# Patient Record
Sex: FEMALE | Race: WHITE
[De-identification: names, ages, dates, MRNs, and addresses within clinical notes are randomized per-mention and may not be internally consistent; named-entity substitution may affect disease eponyms.]

---
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
## 2021-11-08 NOTE — EDPHY
H & P


Stated Complaint: RLQ abd pain


Time Seen by Provider: 03/19/18 12:50


HPI/ROS: 





Chief Complaint:  Abdominal pain, fever, back pain





HPI:  18-year-old woman began having fevers last night with some low back pain.

  She did have an episode of some right side abdominal pain which has since 

resolved.  She took Advil last night and slept overnight.  This morning she is 

continuing to have low back pain and some subjective fever but has not had any 

further abdominal pain.  No urinary urgency or frequency.  Last menstrual 

period was about a month ago and normal.  She is on oral contraceptives.  Has 

never been pregnant.  No abdominal vaginal discharge.  No history of STDs in 

the past.  No nausea or vomiting.  Some constipation.





ROS:  10 point Review of Systems is negative except as noted in the HPI.





PMH:  Denies





Social History: No smoking, no alcohol,  no recreational drug use





Family History: non-contributory





Physical Exam:


Gen: Awake, Alert, No Distress


HEENT:  


     Nose: no rhinorrhea


     Eyes: PERRLA, EOMI


     Mouth: Moist mucosa 


Neck: Supple, no JVD


Chest: nontender, lungs clear to auscultation


Heart: S1, S2 normal, no murmur


Abd: Soft, non-tender, no guarding


Back: no CVA tenderness, no midline tenderness 


Ext: no edema, non-tender


Skin: no rash


Neuro: CN II-XII intact, Sensation grossly intact, Strength 5/5 in bilateral 

upper and lower extremities








- Personal History


LMP (Females 10-55): 22-28 Days Ago


Current Tetanus/Diphtheria Vaccine: Yes


Current Tetanus Diphtheria and Acellular Pertussis (TDAP): Yes





- Medical/Surgical History


Hx Asthma: No


Hx Chronic Respiratory Disease: No


Hx Diabetes: No


Hx Cardiac Disease: No


Hx Renal Disease: No


Hx Cirrhosis: No


Hx Alcoholism: No


Hx HIV/AIDS: No


Hx Splenectomy or Spleen Trauma: No


Other PMH: L knee surgery x2,





- Social History


Smoking Status: Never smoked


Constitutional: 





 Initial Vital Signs











Temperature (C)  37.2 C   03/19/18 11:44


 


Heart Rate  101 H  03/19/18 11:44


 


Respiratory Rate  16   03/19/18 11:44


 


Blood Pressure  116/94 H  03/19/18 11:44


 


O2 Sat (%)  98   03/19/18 11:44








 











O2 Delivery Mode               Room Air














Allergies/Adverse Reactions: 


 





No Known Allergies Allergy (Unverified 03/19/18 11:44)


 








Home Medications: 














 Medication  Instructions  Recorded


 


Bcp  03/19/18














Medical Decision Making


ED Course/Re-evaluation: 





Well-appearing 18-year-old female with back pain, subjective fever and low 

abdominal pain yesterday.  She is no abdominal pain today.  On examination her 

abdomen is soft and benign.  Blood work and urinalysis are normal.  She is not 

pregnant.  Symptoms consistent with a likely early viral syndrome.  Will 

discharge with follow up at [facility], return for any concerns.





- Data Points


Laboratory Results: 





 Laboratory Results





 03/19/18 12:05 





 03/19/18 12:05 





 











  03/19/18 03/19/18 03/19/18





  12:05 12:05 12:05


 


WBC      5.36 10^3/uL 10^3/uL





     (3.80-9.50) 


 


RBC      4.82 10^6/uL 10^6/uL





     (4.18-5.33) 


 


Hgb      14.7 g/dL g/dL





     (12.6-16.3) 


 


Hct      41.7 % %





     (38.0-47.0) 


 


MCV      86.5 fL fL





     (81.5-99.8) 


 


MCH      30.5 pg pg





     (27.9-34.1) 


 


MCHC      35.3 g/dL g/dL





     (32.4-36.7) 


 


RDW      12.0 % %





     (11.5-15.2) 


 


Plt Count      169 10^3/uL 10^3/uL





     (150-400) 


 


MPV      9.9 fL fL





     (8.7-11.7) 


 


Neut % (Auto)      70.7 % %





     (39.3-74.2) 


 


Lymph % (Auto)      20.1 % %





     (15.0-45.0) 


 


Mono % (Auto)      8.2 % %





     (4.5-13.0) 


 


Eos % (Auto)      0.2 % L %





     (0.6-7.6) 


 


Baso % (Auto)      0.4 % %





     (0.3-1.7) 


 


Nucleat RBC Rel Count      0.0 % %





     (0.0-0.2) 


 


Absolute Neuts (auto)      3.79 10^3/uL 10^3/uL





     (1.70-6.50) 


 


Absolute Lymphs (auto)      1.08 10^3/uL 10^3/uL





     (1.00-3.00) 


 


Absolute Monos (auto)      0.44 10^3/uL 10^3/uL





     (0.30-0.80) 


 


Absolute Eos (auto)      0.01 10^3/uL L 10^3/uL





     (0.03-0.40) 


 


Absolute Basos (auto)      0.02 10^3/uL 10^3/uL





     (0.02-0.10) 


 


Absolute Nucleated RBC      0.00 10^3/uL 10^3/uL





     (0-0.01) 


 


Immature Gran %      0.4 % %





     (0.0-1.1) 


 


Immature Gran #      0.02 10^3/uL 10^3/uL





     (0.00-0.10) 


 


Sodium    137 mEq/L mEq/L  





    (135-145)  


 


Potassium    3.7 mEq/L mEq/L  





    (3.5-5.2)  


 


Chloride    102 mEq/L mEq/L  





    ()  


 


Carbon Dioxide    25 mEq/l mEq/l  





    (22-31)  


 


Anion Gap    10 mEq/L mEq/L  





    (8-16)  


 


BUN    11 mg/dL mg/dL  





    (7-23)  


 


Creatinine    0.7 mg/dL mg/dL  





    (0.6-1.0)  


 


Estimated GFR    > 60   





    


 


Glucose    122 mg/dL H mg/dL  





    ()  


 


Calcium    9.0 mg/dL mg/dL  





    (8.5-10.4)  


 


Beta HCG, Qual  NEGATIVE     





    


 


Urine Color      





    


 


Urine Appearance      





    


 


Urine pH      





    


 


Ur Specific Gravity      





    


 


Urine Protein      





    


 


Urine Ketones      





    


 


Urine Blood      





    


 


Urine Nitrate      





    


 


Urine Bilirubin      





    


 


Urine Urobilinogen      





    


 


Ur Leukocyte Esterase      





    


 


Urine Glucose      





    














  03/19/18





  12:00


 


WBC  





  


 


RBC  





  


 


Hgb  





  


 


Hct  





  


 


MCV  





  


 


MCH  





  


 


MCHC  





  


 


RDW  





  


 


Plt Count  





  


 


MPV  





  


 


Neut % (Auto)  





  


 


Lymph % (Auto)  





  


 


Mono % (Auto)  





  


 


Eos % (Auto)  





  


 


Baso % (Auto)  





  


 


Nucleat RBC Rel Count  





  


 


Absolute Neuts (auto)  





  


 


Absolute Lymphs (auto)  





  


 


Absolute Monos (auto)  





  


 


Absolute Eos (auto)  





  


 


Absolute Basos (auto)  





  


 


Absolute Nucleated RBC  





  


 


Immature Gran %  





  


 


Immature Gran #  





  


 


Sodium  





  


 


Potassium  





  


 


Chloride  





  


 


Carbon Dioxide  





  


 


Anion Gap  





  


 


BUN  





  


 


Creatinine  





  


 


Estimated GFR  





  


 


Glucose  





  


 


Calcium  





  


 


Beta HCG, Qual  





  


 


Urine Color  YELLOW 





  


 


Urine Appearance  CLEAR 





  


 


Urine pH  6.0 





   (5.0-7.5) 


 


Ur Specific Gravity  1.014 





   (1.002-1.030) 


 


Urine Protein  NEGATIVE 





   (NEGATIVE) 


 


Urine Ketones  TRACE  H 





   (NEGATIVE) 


 


Urine Blood  NEGATIVE 





   (NEGATIVE) 


 


Urine Nitrate  NEGATIVE 





   (NEGATIVE) 


 


Urine Bilirubin  NEGATIVE 





   (NEGATIVE) 


 


Urine Urobilinogen  NEGATIVE EU EU





   (0.2-1.0) 


 


Ur Leukocyte Esterase  NEGATIVE 





   (NEGATIVE) 


 


Urine Glucose  NEGATIVE 





   (NEGATIVE) 














Departure





- Departure


Disposition: Home, Routine, Self-Care


Clinical Impression: 


 Viral syndrome





Condition: Good


Instructions:  Viral Syndrome (ED)


Additional Instructions: 


Alternate acetaminophen (1000 mg) with ibuprofen (400 mg) every 4 hours as 

needed for fevers, chills, aches or pain.


Follow up with [facility] in 2-3 days if symptoms are not improving.


Return to the emergency department for increasing abdominal pain, back pain, 

fevers or chills, uncontrolled nausea vomiting, or any other concerns.


Referrals: 


[facility],. [Clinic] - As per Instructions Cheiloplasty (Less Than 50%) Text: A decision was made to reconstruct the defect with a  cheiloplasty.  The defect was undermined extensively.  Additional obicularis oris muscle was excised with a 15 blade scalpel.  The defect was converted into a full thickness wedge, of less than 50% of the vertical height of the lip, to facilite a better cosmetic result.  Small vessels were then tied off with 5-0 monocyrl. The obicularis oris, superficial fascia, adipose and dermis were then reapproximated.  After the deeper layers were approximated the epidermis was reapproximated with particular care given to realign the vermilion border.